# Patient Record
(demographics unavailable — no encounter records)

---

## 2024-10-15 NOTE — HISTORY OF PRESENT ILLNESS
[FreeTextEntry1] : 38 yo P1 w/ paragard IUD presents for changes in menses over last 2 months. Reports recent thyroid medication change, she decreased her tyrosint to 62.5 also 2 months ago. Reports spotting prior to menses and then again after menses end.

## 2024-10-15 NOTE — PHYSICAL EXAM
[Chaperone Present] : A chaperone was present in the examining room during all aspects of the physical examination [[id number]] : A chaperone was present during the pelvic exam. [Appropriately responsive] : appropriately responsive [Labia Majora] : normal [Labia Minora] : normal [No Bleeding] : There was no active vaginal bleeding [Normal] : normal [Uterine Adnexae] : normal [FreeTextEntry2] : Cici [FreeTextEntry5] : numerous nabothian cysts

## 2024-10-15 NOTE — PROCEDURE
[Abnormal Uterine Bleeding] : abnormal uterine bleeding [Transvaginal Ultrasound] : transvaginal ultrasound [Anteverted] : anteverted [No Fibroid(s)] : no fibroid(s) [L: ___ cm] : L: [unfilled] cm [W: ___cm] : W: [unfilled] cm [Not Visualized] : not visualized [FreeTextEntry5] : ES: IUD in place, ES 1.4 cm, no CF on doppler [FreeTextEntry6] : cervix: 4.02 cm, numerous nabothian cysts, too many to count [FreeTextEntry4] : multiple nabothian cysts, IUD in place